# Patient Record
Sex: FEMALE | Race: WHITE | NOT HISPANIC OR LATINO | URBAN - METROPOLITAN AREA
[De-identification: names, ages, dates, MRNs, and addresses within clinical notes are randomized per-mention and may not be internally consistent; named-entity substitution may affect disease eponyms.]

---

## 2017-03-30 ENCOUNTER — OFFICE VISIT - RIVER FALLS (OUTPATIENT)
Dept: FAMILY MEDICINE | Facility: CLINIC | Age: 37
End: 2017-03-30

## 2017-03-30 ASSESSMENT — MIFFLIN-ST. JEOR: SCORE: 1253.75

## 2017-09-28 ENCOUNTER — OFFICE VISIT - RIVER FALLS (OUTPATIENT)
Dept: FAMILY MEDICINE | Facility: CLINIC | Age: 37
End: 2017-09-28

## 2017-09-28 ASSESSMENT — MIFFLIN-ST. JEOR: SCORE: 1249.21

## 2017-09-29 LAB
CHOLEST SERPL-MCNC: 139 MG/DL
CHOLEST/HDLC SERPL: 2.4 {RATIO}
GLUCOSE BLD-MCNC: 86 MG/DL (ref 65–99)
HDLC SERPL-MCNC: 57 MG/DL
LDLC SERPL CALC-MCNC: 71 MG/DL
NONHDLC SERPL-MCNC: 82 MG/DL
TRIGL SERPL-MCNC: 37 MG/DL

## 2018-07-23 ENCOUNTER — OFFICE VISIT - RIVER FALLS (OUTPATIENT)
Dept: FAMILY MEDICINE | Facility: CLINIC | Age: 38
End: 2018-07-23

## 2018-07-23 ASSESSMENT — MIFFLIN-ST. JEOR: SCORE: 1253.75

## 2019-07-29 ENCOUNTER — OFFICE VISIT - RIVER FALLS (OUTPATIENT)
Dept: FAMILY MEDICINE | Facility: CLINIC | Age: 39
End: 2019-07-29

## 2019-07-29 ASSESSMENT — MIFFLIN-ST. JEOR: SCORE: 1249.21

## 2019-09-05 ENCOUNTER — OFFICE VISIT - RIVER FALLS (OUTPATIENT)
Dept: FAMILY MEDICINE | Facility: CLINIC | Age: 39
End: 2019-09-05

## 2020-05-21 ENCOUNTER — OFFICE VISIT - RIVER FALLS (OUTPATIENT)
Dept: FAMILY MEDICINE | Facility: CLINIC | Age: 40
End: 2020-05-21

## 2021-01-29 ENCOUNTER — IMMUNIZATION (OUTPATIENT)
Dept: NURSING | Facility: CLINIC | Age: 41
End: 2021-01-29
Payer: COMMERCIAL

## 2021-01-29 PROCEDURE — 91300 PR COVID VAC PFIZER DIL RECON 30 MCG/0.3 ML IM: CPT

## 2021-01-29 PROCEDURE — 0001A PR COVID VAC PFIZER DIL RECON 30 MCG/0.3 ML IM: CPT

## 2021-01-31 ENCOUNTER — HEALTH MAINTENANCE LETTER (OUTPATIENT)
Age: 41
End: 2021-01-31

## 2021-02-19 ENCOUNTER — IMMUNIZATION (OUTPATIENT)
Dept: NURSING | Facility: CLINIC | Age: 41
End: 2021-02-19
Attending: INTERNAL MEDICINE
Payer: COMMERCIAL

## 2021-02-19 PROCEDURE — 0002A PR COVID VAC PFIZER DIL RECON 30 MCG/0.3 ML IM: CPT

## 2021-02-19 PROCEDURE — 91300 PR COVID VAC PFIZER DIL RECON 30 MCG/0.3 ML IM: CPT

## 2021-06-07 ENCOUNTER — OFFICE VISIT - RIVER FALLS (OUTPATIENT)
Dept: FAMILY MEDICINE | Facility: CLINIC | Age: 41
End: 2021-06-07

## 2021-06-07 ASSESSMENT — MIFFLIN-ST. JEOR: SCORE: 1270.53

## 2021-06-08 ENCOUNTER — COMMUNICATION - RIVER FALLS (OUTPATIENT)
Dept: FAMILY MEDICINE | Facility: CLINIC | Age: 41
End: 2021-06-08

## 2021-06-08 LAB
CHOLEST SERPL-MCNC: 144 MG/DL
CHOLEST/HDLC SERPL: 2.4 {RATIO}
GLUCOSE BLD-MCNC: 83 MG/DL (ref 65–99)
HDLC SERPL-MCNC: 60 MG/DL
LDLC SERPL CALC-MCNC: 72 MG/DL
NONHDLC SERPL-MCNC: 84 MG/DL
TRIGL SERPL-MCNC: 42 MG/DL

## 2021-09-11 ENCOUNTER — HEALTH MAINTENANCE LETTER (OUTPATIENT)
Age: 41
End: 2021-09-11

## 2022-02-11 VITALS
BODY MASS INDEX: 25.58 KG/M2 | DIASTOLIC BLOOD PRESSURE: 73 MMHG | WEIGHT: 139 LBS | HEIGHT: 62 IN | SYSTOLIC BLOOD PRESSURE: 106 MMHG | HEART RATE: 84 BPM | TEMPERATURE: 97.6 F

## 2022-02-11 VITALS
HEIGHT: 62 IN | BODY MASS INDEX: 25.4 KG/M2 | WEIGHT: 138 LBS | TEMPERATURE: 98.6 F | SYSTOLIC BLOOD PRESSURE: 112 MMHG | HEART RATE: 68 BPM | DIASTOLIC BLOOD PRESSURE: 70 MMHG

## 2022-02-11 VITALS
HEART RATE: 70 BPM | HEIGHT: 62 IN | BODY MASS INDEX: 25.58 KG/M2 | TEMPERATURE: 97.9 F | SYSTOLIC BLOOD PRESSURE: 108 MMHG | DIASTOLIC BLOOD PRESSURE: 64 MMHG | WEIGHT: 139 LBS

## 2022-02-11 VITALS
SYSTOLIC BLOOD PRESSURE: 110 MMHG | HEIGHT: 62 IN | BODY MASS INDEX: 25.4 KG/M2 | DIASTOLIC BLOOD PRESSURE: 70 MMHG | HEART RATE: 76 BPM | TEMPERATURE: 96.3 F | WEIGHT: 138 LBS

## 2022-02-11 VITALS
DIASTOLIC BLOOD PRESSURE: 68 MMHG | SYSTOLIC BLOOD PRESSURE: 100 MMHG | WEIGHT: 140 LBS | TEMPERATURE: 98.9 F | HEART RATE: 96 BPM | OXYGEN SATURATION: 97 % | BODY MASS INDEX: 25.61 KG/M2

## 2022-02-11 VITALS
TEMPERATURE: 97.8 F | SYSTOLIC BLOOD PRESSURE: 104 MMHG | DIASTOLIC BLOOD PRESSURE: 70 MMHG | HEART RATE: 80 BPM | WEIGHT: 142.7 LBS | HEIGHT: 62 IN | BODY MASS INDEX: 26.26 KG/M2

## 2022-02-16 NOTE — NURSING NOTE
Generalized Anxiety Disorder Screening Entered On:  6/15/2021 2:59 PM CDT    Performed On:  6/7/2021 2:58 PM CDT by Carolyn Mckeon               SY-7   SY Nervous, Anxious On Edge :   Several days   SY Control Worrying B :   Several days   SY Worrying Too Much :   Several days   SY Trouble Relaxing :   More than half the days   SY Restless :   More than half the days   SY Easily Annoyed/Irritable :   Several days   SY Afraid :   Several days   SY Total Screening Score :   9    Carolyn Mckeon - 6/15/2021 2:58 PM CDT

## 2022-02-16 NOTE — NURSING NOTE
Comprehensive Intake Entered On:  2019 2:13 PM CDT    Performed On:  2019 2:09 PM CDT by Arti Bernal CMA               Summary   Chief Complaint :   Medication management   Weight Measured :   138 lb(Converted to: 138 lb 0 oz, 62.60 kg)    Height Measured :   62 in(Converted to: 5 ft 2 in, 157.48 cm)    Body Mass Index :   25.24 kg/m2 (HI)    Body Surface Area :   1.65 m2   Systolic Blood Pressure :   112 mmHg   Diastolic Blood Pressure :   70 mmHg   Mean Arterial Pressure :   84 mmHg   Peripheral Pulse Rate :   68 bpm   BP Site :   Right arm   Pulse Site :   Radial artery   BP Method :   Manual   HR Method :   Manual   Temperature Tympanic :   98.6 DegF(Converted to: 37.0 DegC)    Arti Bernal CMA - 2019 2:09 PM CDT   Health Status   Allergies Verified? :   Yes   Medication History Verified? :   Yes   Medical History Verified? :   No   Pre-Visit Planning Status :   Completed   Tobacco Use? :   Never smoker   Arti Bernal CMA - 2019 2:09 PM CDT   Demographics   Last Name :   YELITZA   Address :   [address] [place] [address]   First Name :   ITZEL Dela Cruz Initial :   L   Responsible Party Date of Birth () :   1980 CDT   City :   [place]   State :   [place]   Zip Code :   [address]   Arti Bernal CMA - 2019 2:09 PM CDT   Ancillary Services Grid   Name :    [facility]              Type of Service :    Pharmacy              Location :    [place], [place]                Arti Bernal CMA - 2019 2:09 PM CDT         Meds / Allergies   (As Of: 2019 2:13:13 PM CDT)   Allergies (Active)   penicillin  Estimated Onset Date:   Unspecified ; Reactions:   rash ; Created By:   Juancarlos Duenas MD; Reaction Status:   Active ; Category:   Drug ; Substance:   penicillin ; Type:   Allergy ; Updated By:   Juancarlos Duenas MD; Reviewed Date:   2019 2:11 PM CDT        Medication List   (As Of: 2019 2:13:13 PM CDT)   Prescription/Discharge Order    buPROPion  :   buPROPion ; Status:   Prescribed ;  Ordered As Mnemonic:   buPROPion 150 mg/24 hours (XL) oral tablet, extended release ; Simple Display Line:   450 mg, 3 tab(s), po, q 24 hrs, 280 tab(s), 3 Refill(s) ; Ordering Provider:   Juancarlos Duenas MD; Catalog Code:   buPROPion ; Order Dt/Tm:   7/23/2018 9:14:35 AM          venlafaxine  :   venlafaxine ; Status:   Prescribed ; Ordered As Mnemonic:   Effexor XR 75 mg oral capsule, extended release ; Simple Display Line:   75 mg, 1 cap(s), po, daily, 95 cap(s), 3 Refill(s) ; Ordering Provider:   Juancarlos Duenas MD; Catalog Code:   venlafaxine ; Order Dt/Tm:   7/23/2018 9:14:52 AM

## 2022-02-16 NOTE — PROGRESS NOTES
Chief Complaint    Concerns with HA, semi-productive cough, nasal drainage/congestion x 5 days. Today chest tightness. No recent travel.  History of Present Illness      Cold symptoms for past 5 days with nasal drainage and congestion. Has had cough and body aches. Energy actually improving. Harder to take a deep breath. Having some wheezing and chest congestion.      Daughter recently had pneumonia.  Review of Systems      No fevers      No vomiting      No leg swelling      No family history of blood clots  Physical Exam   Vitals & Measurements    T: 98.9   F (Tympanic)  HR: 96(Peripheral)  BP: 100/68  SpO2: 97%     WT: 140 lb       General: No acute distress.      HENT: Tympanic membranes are clear, No pharyngeal erythema.      Neck: No lymphadenopathy.      Respiratory: Crackles right lower lobe.  No significant wheezing      Cardiovascular: Normal rate, Regular rhythm.      Musculoskeletal: Normal gait.      Discussed and patient declines chest x-ray for now.  Assessment/Plan      Pneumonia: Treated with Zithromax and follow-up if not improving  Patient Information     Name:ITZEL TORRE      Address:      [address] [place] [address]     Sex:Female     YOB: 1980     [phone]     Emergency Contact:ELTON TORRE     MRN:[id number]     FIN:[id number]     Location:[facility]     Date of Service:09/05/2019      Primary Care Physician:       Juancarlso Duenas MD, [phone]      Attending Physician:       Juancarlos Duenas MD, [phone]  Problem List/Past Medical History    Ongoing     Anxiety     Chemical dependency     Chronic GERD     Eating disorder     History of chicken pox     Mild recurrent major depression     Seasonal allergies    Historical     Pregnancy     Pregnancy  Procedure/Surgical History     Extraction of wisdom tooth  Medications    buPROPion 150 mg/24 hours (XL) oral tablet, extended release, 450 mg= 3 tab(s), Oral, q 24 hrs, 3  refills    Effexor XR 75 mg oral capsule, extended release, 75 mg= 1 cap(s), Oral, daily, 3 refills  Allergies    penicillin (rash)

## 2022-02-16 NOTE — TELEPHONE ENCOUNTER
---------------------  From: Juancarlos Duenas MD   To: ITZEL TORRE    Sent: 6/8/2021 10:16:39 AM CDT  Subject: Labs     Dear Itzel Kemp    Labs are good  No diabetes  Cholesterol excellent      Results:  Date Result Name Value Ref Range   6/7/2021 8:52 AM Glucose Level 83 mg/dL (65 - 99)   6/7/2021 8:52 AM Cholesterol 144 mg/dL ( - <200)   6/7/2021 8:52 AM Non-HDL Cholesterol 84 ( - <130)   6/7/2021 8:52 AM HDL 60 mg/dL (> OR = 50 - )   6/7/2021 8:52 AM Cholesterol/HDL Ratio 2.4 ( - <5.0)   6/7/2021 8:52 AM LDL 72    6/7/2021 8:52 AM Triglyceride 42 mg/dL ( - <150)     Bc Duenas M.D.

## 2022-02-16 NOTE — PROGRESS NOTES
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 Recovery going well.  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 Has reapplied to the board and getting random drug testing.  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  Realized how depressed she was from drugs. Unsure if drugs contributed to the depression. Had bad postpartum depression with previous pregnancy while on cymbalta. Restarted Effexor  and has done well.  Has been on antidepressants since age 20. Other medications have worked but experienced tachyphylaxis but other medications and depression could have been affected by her drug use. Clean as of 2012 from drugs and alcohol  Panic attacks have become rare since stopping drugs and when has them much less severe. Still gets some anxiety.  Never had any suicide attempts.      Date of Service: 2018 08:40 am  Performing Location: [facility]  Encounter#: [id number]      Primary Care Provider (PCP):  Juancarlos Duenas MD    NPI# [id number]      Referring Provider:  Juancarlos Duenas MD    NPI# [id number]      Chief Complaint   2018 9:02 AM CDT    Depression/anxiety med check        History of Present Illness     Combination of Wellbutrin and effexor has her feeling normal. Has increased Wellbutrin XL to 450mg and tolerating it better (cloudy at that dose in past).    2012 last alcohol and drugs. Had not been sober since age 16.  Started RN job in outpatient Dermatology 2016. No longer being monitored.No narcotics in this  clinic.    Past information:  Anxiety is all the time but more noticeable in the evening. She feels irritable. She is anxious about her weight and gets angry mad thoughts about her weight. Her therapist is worried she might relapse with drugs if the anxiety is not controlled. She does not have panic attacks. It is an overwhelming sense of inability to settle down. Does not want to see people because of her weight and knows logically that is not right. Anxiety started when went to college. Tried paxil, zoloft, cymbalta. Feels blunted when on higher dose of Effexor.     She and  have noticed an improvement in her anxiety. Feels more laid back and gets less done. Feels will always have thoughts about her weight (has thought about it since 6 years old). Imipramine helped her bridge herself until she could work on the mental tools to help with her anxiety. Imipramine has helped all the symptoms she was experiencing in Feb 2014.    Had negative urine testing August and September 2012 (checked 3x) and June 2014         Review of Systems     Constitutional: falling asleep normally. Still waking up and binge eating in the night.  Neurologic: minimal headaches  PHQ-9: 2pts (July 2018). 1pt (March 2017). 0pts (October 2016). 3pts (June 2016). 0pts (February 2015). 1pt (June 2014); 2pts (March2014). 2pts (February 2014). 4pts (October 2013). 1pts (June 2013); 0pts (Feb 2013)  MDQ: 2 pts and no problem (June 2016). 0pts (June 2014) 1pt and no problem (March 2014). 1pt and no problem (February 2014)  SY-7: 2pts (July 2018). 1pt (March 2017). 0pts (October 2016). 11 pts (June 2016)  CAGE: 0pts (quit drinking)       Health Status   Allergies:    Allergic Reactions (Selected)  Severity Not Documented  Penicillin (Rash)   Medications:  (Selected)   Prescriptions  Prescribed  Effexor XR 75 mg oral capsule, extended release: 1 cap(s) ( 75 mg ), po, daily, # 95 cap(s), 3 Refill(s), Type: Maintenance, [facility]  [place], [place], 1 cap(s) po daily  buPROPion 150 mg/24 hours (XL) oral tablet, extended release: 3 tab(s) ( 450 mg ), po, q 24 hrs, # 280 tab(s), 0 Refill(s), Type: Maintenance, Pharmacy: [facility], [place], [place], 3 tab(s) po q 24 hrs   Problem list:    All Problems  Anxiety / SNOMED CT 03676757 / Confirmed  Chemical dependency / SNOMED CT 962310361 / Confirmed  Eating disorder / SNOMED CT 550154348 / Confirmed  Chronic GERD / SNOMED CT 8437419620 / Confirmed  History of chicken pox / SNOMED CT 890624497 / Confirmed  Mild recurrent major depression / SNOMED CT 433973709 / Confirmed  Seasonal allergies / SNOMED CT 537866552 / Confirmed  Resolved: Pregnancy / SNOMED CT 762228274  Resolved: Pregnancy / SNOMED CT 965214946  Canceled: Depression, major, recurrent, mild / ICD-9-.31      Histories   Family History.Procedure history:    Extraction of wisdom tooth (SNOMED CT 538529474).     Family History: no bipolar, ADHD, anxiety, suicide; father/mother/brother with depression; mom addicted oxycontin in past; brother some addiction  Social History:  ([employer]) 2009. 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    Neck:  No lymphadenopathy.    Respiratory:  Lungs are clear to auscultation.    Cardiovascular:  Normal rate, Regular rhythm.    Musculoskeletal:  Normal gait.    Psychiatric:  Appropriate mood & affect, Non-suicidal.        Impression and Plan   Diagnosis     Depression, major, recurrent, mild (UTB82-UO F33.0).     Anxiety (BCX19-SB F41.9).     Chemical Dependency (IPN79-OM F19.20).     Depression/anxiety: continue Wellbutrin XL 450mg daily and Effexor XR 75mg daily. Goes to meetings twice a month (Nursing peer support network). Continues to be sober.     Discussed wellbutrin and effexor with pregnancy although no desire to have any more children (has 2 children)    Reports being nervous about handling narcotics and wants to avoid it (none in the Dermatology clinic).

## 2022-02-16 NOTE — PROGRESS NOTES
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 Recovery going well.  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 Has reapplied to the board and getting random drug testing.  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  Realized how depressed she was from drugs. Unsure if drugs contributed to the depression. Had bad postpartum depression with previous pregnancy while on cymbalta. Restarted Effexor  and has done well.  Has been on antidepressants since age 20. Other medications have worked but experienced tachyphylaxis but other medications and depression could have been affected by her drug use. Clean as of 2012 from drugs and alcohol  Panic attacks have become rare since stopping drugs and when has them much less severe. Still gets some anxiety.  Never had any suicide attempts.      Date of Service: 2019 02:00 pm  Performing Location: [facility]  Encounter#: [id number]      Primary Care Provider (PCP):  Juancarlos Duenas MD    NPI# [id number]      Referring Provider:  Juancarlos Duenas MD    NPI# [id number]      Chief Complaint   2019 2:09 PM CDT    Medication management        History of Present Illness     Combination of Wellbutrin and effexor has her feeling normal. Anxiety still present but controlled. Has increased Wellbutrin XL to 450mg and tolerating it better (cloudy at that dose in past).    2012 last alcohol and drugs. Had not been sober since age 16.  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 No  longer being monitored.No narcotics in this clinic.    Past information:  Anxiety is all the time but more noticeable in the evening. She feels irritable. She is anxious about her weight and gets angry mad thoughts about her weight. Her therapist is worried she might relapse with drugs if the anxiety is not controlled. She does not have panic attacks. It is an overwhelming sense of inability to settle down. Does not want to see people because of her weight and knows logically that is not right. Anxiety started when went to college. Tried paxil, zoloft, cymbalta. Feels blunted when on higher dose of Effexor.     She and  have noticed an improvement in her anxiety. Feels more laid back and gets less done. Feels will always have thoughts about her weight (has thought about it since 6 years old). Imipramine helped her bridge herself until she could work on the mental tools to help with her anxiety. Imipramine has helped all the symptoms she was experiencing in Feb 2014.    Had negative urine testing August and September 2012 (checked 3x) and June 2014         Review of Systems     Constitutional: falling asleep normally. Still waking up and binge eating in the night.  Neurologic: minimal headaches  PHQ-9: 2pts (July 2019). 2pts (July 2018). 1pt (March 2017). 0pts (October 2016). 3pts (June 2016). 0pts (February 2015). 1pt (June 2014); 2pts (March2014). 2pts (February 2014). 4pts (October 2013). 1pts (June 2013); 0pts (Feb 2013)  MDQ: 0pts (July 2019). 2 pts and no problem (June 2016). 0pts (June 2014) 1pt and no problem (March 2014). 1pt and no problem (February 2014)  SY-7: 6pts (July 2019). 2pts (July 2018). 1pt (March 2017). 0pts (October 2016). 11 pts (June 2016)  CAGE: 0pts (quit drinking)       Health Status   Allergies:    Allergic Reactions (Selected)  Severity Not Documented  Penicillin (Rash)   Medications:  (Selected)   Prescriptions  Prescribed  Effexor XR 75 mg oral capsule, extended release: 1  cap(s) ( 75 mg ), po, daily, # 95 cap(s), 3 Refill(s), Type: Maintenance, Pharmacy: [facility], [place], [place], 1 cap(s) Oral daily  buPROPion 150 mg/24 hours (XL) oral tablet, extended release: 3 tab(s) ( 450 mg ), po, q 24 hrs, # 280 tab(s), 3 Refill(s), Type: Maintenance, Pharmacy: [facility], [place], [place], 3 tab(s) Oral q 24 hrs   Problem list:    All Problems  Anxiety / SNOMED CT 82062277 / Confirmed  Chemical dependency / SNOMED CT 142130052 / Confirmed  Eating disorder / SNOMED CT 654353356 / Confirmed  Chronic GERD / SNOMED CT 3329833770 / Confirmed  History of chicken pox / SNOMED CT 995531595 / Confirmed  Mild recurrent major depression / SNOMED CT 044915259 / Confirmed  Seasonal allergies / SNOMED CT 148512349 / Confirmed  Resolved: Pregnancy / SNOMED CT 874167803  Resolved: Pregnancy / SNOMED CT 422180249  Canceled: Depression, major, recurrent, mild / ICD-9-.31      Histories   Procedure history:    Extraction of wisdom tooth (SNOMED CT 886775038).     Family History: no bipolar, ADHD, anxiety, suicide; father/mother/brother with depression. Mom addicted oxycontin in past. 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    Neck:  No lymphadenopathy.    Respiratory:  Lungs are clear to auscultation.    Cardiovascular:  Normal rate,  Regular rhythm.    Musculoskeletal:  Normal gait.    Psychiatric:  Appropriate mood & affect, Non-suicidal.       Impression and Plan   Diagnosis     Depression, major, recurrent, mild (HTW28-GN F33.0).     Anxiety (BTZ02-MD F41.9).     Chemical Dependency (VGK89-UJ F19.20).       Depression/anxiety: continue Wellbutrin XL 450mg daily and Effexor XR 75mg daily. Continues to be sober.     Have discussed wellbutrin and effexor with pregnancy although no desire to have any more children (has 2 children)  Reports being nervous about handling narcotics and wants to avoid it (none in the Dermatology clinic).    Gets [facility] women [facility] through [facility]

## 2022-02-16 NOTE — TELEPHONE ENCOUNTER
---------------------  From: Radha Munoz CMA   To: [facility] Message Pool ([id number]_[place]);     Sent: 5/25/2021 5:45:10 PM CDT  Subject: Wellbutrin refill     PCP:   ARYAN      Time of Call:  1740       Person Calling:  Patient  Phone number:  [phone]    Returned call at: _    Note:   Patient called asking for a small refill of Wellbutrin be sent to [facility]. Last filled 5/21/20 x1 year. She has an annual scheduled for 6/7/21. She did not mention needing the Effexor Rx refilled. Advise    Last office visit and reason:  5/21/20 Med check w/ ARYAN---------------------  From: rAti Bernal CMA ([facility] Message Pool ([id number]_[facility]))   To: Juancarlos Duenas MD;     Sent: 5/26/2021 9:05:53 AM CDT  Subject: FW: Wellbutrin refill---------------------  From: Juancarlos Duenas MD   To: Arti Bernal CMA;     Sent: 5/26/2021 9:23:58 AM CDT  Subject: RE: Wellbutrin refill     DoneCalled and LVM notifying pt

## 2022-02-16 NOTE — NURSING NOTE
Generalized Anxiety Disorder Screening Entered On:  7/30/2019 8:39 AM CDT    Performed On:  7/29/2019 8:36 AM CDT by Latosha Philippe               Generalized Anxiety Disorder Screening   SY Nervous, Anxious On Edge :   Not at all   SY Control Worrying B :   Several days   SY Worrying Too Much :   Several days   SY Restless :   Several days   SY Easily Annoyed/Irritable :   Several days   SY Afraid :   Several days   SY Trouble Relaxing :   Several days   SY Total Screening Score :   6    SY Difficulty with Work, Home, Others :   Not difficult at all   Latosha Philippe - 7/30/2019 8:36 AM CDT

## 2022-02-16 NOTE — PROGRESS NOTES
Patient:   ITZEL TORRE            MRN: [id number]            FIN: [id number]               Age:   40 years     Sex:  Female     :  1980   Associated Diagnoses:   Annual physical exam; Anxiety; Mild recurrent major depression   Author:   Juancarlos Duenas MD      Visit Information      Date of Service: 2021 08:00 am  Performing Location: [facility]  Encounter#: [id number]      Primary Care Provider (PCP):  Juancarlos Duenas MD    NPI# [id number]      Chief Complaint   2021 8:05 AM CDT     Yearly px        Well Adult History   Mom had deep brain stimulator for essential tremor and has gone down hill. Possibly has Alzeimhers    Combination of Wellbutrin and effexor has her feeling normal. Anxiety still present but controlled. Has increased Wellbutrin XL to 450mg and tolerating it better (still feels some brain fog).    2012 last alcohol and drugs. Had not been sober since age 16.  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 No longer being monitored. No narcotics in this clinic.    Past information:  Before wellbutrin and effexor anxiety was all the time but more noticeable in the evening. She felt irritable. She was anxious about her weight and would get angry mad thoughts about her weight. Her therapist was worried she might relapse with drugs if the anxiety was not controlled. She did not have panic attacks. It was an overwhelming sense of inability to settle down. Did not want to see people because of her weight and knew logically that was not right. Anxiety started when went to college. Tried paxil, zoloft, cymbalta. Felt blunted when on higher dose of Effexor.   She and  have noticed an improvement in her anxiety. Felt more laid back and would get less done. Sterling would always have thoughts about her weight (has thought about it since 6 years old). Imipramine helped her bridge herself until she could work on the mental tools to help with  her anxiety. Imipramine has helped all the symptoms she was experiencing in Feb 2014. Imipramine made her tired.  Realized how depressed she was from drugs. Unsure if drugs contributed to the depression. Had bad postpartum depression with previous pregnancy while on cymbalta. Restarted Effexor 2011 and has done well.  Has been on antidepressants since age 20. Other medications have worked but experienced tachyphylaxis but other medications and depression could have been affected by her drug use. Clean as of July 22, 2012 from drugs and alcohol  Panic attacks have become rare since stopping drugs and when has them much less severe. Still gets some anxiety.      Review of Systems   Constitutional:  No fever.    Eye:  No recent visual problem.    Respiratory:  No shortness of breath, No cough.    Cardiovascular:  No chest pain, No palpitations, No peripheral edema.    Breast:  Negative.    Gastrointestinal:  No nausea, No vomiting, No diarrhea, No constipation, No rectal bleeding.    Genitourinary:  No change in urine stream.    Hematology/Lymphatics:  No bruising tendency, No bleeding tendency.    Endocrine:  Negative.    Musculoskeletal:  No joint pain, No muscle pain.    Integumentary:  No rash.    Neurologic:  Alert and oriented X4, No abnormal balance, No numbness, No headache.    Psychiatric:  Negative.    All other systems reviewed and negative   Regular menses    PHQ-9:  3pts (June 2021). 2pts (July 2019). 2pts (July 2018). 1pt (March 2017). 0pts (October 2016). 3pts (June 2016). 0pts (February 2015). 1pt (June 2014); 2pts (March2014). 2pts (February 2014). 4pts (October 2013). 1pts (June 2013); 0pts (Feb 2013)  MDQ: 0pts (July 2019). 2 pts and no problem (June 2016). 0pts (June 2014) 1pt and no problem (March 2014). 1pt and no problem (February 2014)  SY-7:  9pts (June 2021). 6pts (July 2019). 2pts (July 2018). 1pt (March 2017). 0pts (October 2016). 11 pts (June 2016)  CAGE: 0pts (quit drinking)        Health  Status   Allergies:    Allergic Reactions (Selected)  Severity Not Documented  Penicillin (Rash)   Medications:  (Selected)   Prescriptions  Prescribed  Effexor XR 75 mg oral capsule, extended release: = 1 cap(s) ( 75 mg ), po, daily, # 95 cap(s), 3 Refill(s), Type: Maintenance, Pharmacy: [facility], 1 cap(s) Oral daily, 62, in, 07/29/19 14:09:00 CDT, Height Measured, 140, lb, 09/05/19 15:58:00 CDT, Weight Measured  buPROPion 150 mg/24 hours (XL) oral tablet, extended release: 3 tab(s) ( 450 mg ), po, q 24 hrs, # 280 tab(s), 0 Refill(s), Type: Maintenance, Pharmacy: [facility], [place], [place], 3 tab(s) Oral q 24 hrs, 62, in, 07/29/19 14:09:00 CDT, Height Measured, 140, lb, 09/05/19 15:58:00 CDT, Weight Measured   Problem list:    All Problems  Anxiety / SNOMED CT 48046505 / Confirmed  Chemical dependency / SNOMED CT 900452094 / Confirmed  Eating disorder / SNOMED CT 310213311 / Confirmed  Chronic GERD / SNOMED CT 1383761650 / Confirmed  History of chicken pox / SNOMED CT 790102391 / Confirmed  Mild recurrent major depression / SNOMED CT 985190997 / Confirmed  Seasonal allergies / SNOMED CT 834414272 / Confirmed  Resolved: Pregnancy / SNOMED CT 225579420  Resolved: Pregnancy / SNOMED CT 656468136  Canceled: Depression, major, recurrent, mild / ICD-9-.31      Histories   Procedure history:    Extraction of wisdom tooth (SNOMED CT 950272674).   Family History: no bipolar, ADHD, anxiety, suicide. Father/mother/brother with depression. Mom addicted oxycontin in past. Brother (Aries) some addiction and doing well  Social History:  (Eduardo) 2009. 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 Children ages 7 and 9. Home with children Monday and Friday and go to day care three days a week.  Eduardo works [employer] ().  vasectomy      Physical Examination   Vital Signs   6/7/2021 8:05 AM CDT Temperature Tympanic 97.8 DegF  LOW    Peripheral Pulse Rate 80  bpm    Pulse Site Radial artery    HR Method Manual    Systolic Blood Pressure 104 mmHg    Diastolic Blood Pressure 70 mmHg    Mean Arterial Pressure 81 mmHg    BP Site Right arm    BP Method Manual      Measurements from flowsheet : Measurements   6/7/2021 8:05 AM CDT Height Measured - Standard 62 in    Weight Measured - Standard 142.7 lb    BSA 1.68 m2    Body Mass Index 26.1 kg/m2  HI      General:  Alert and oriented, No acute distress.    Eye:  Normal conjunctiva.    HENT:  Tympanic membranes are clear, No pharyngeal erythema.    Neck:  Non-tender, No lymphadenopathy, No thyromegaly.    Respiratory:  Lungs are clear to auscultation, Respirations are non-labored.    Cardiovascular:  Normal rate, Regular rhythm, No murmur.    Breast:  No mass, No tenderness, No discharge.    Gastrointestinal:  Soft, Non-tender, Non-distended.    Gynecologic:  Internal and external genitalia are normal. Urethra normal. Vaginal and cervical mucosa normal. No uterine or ovarian masses..    Musculoskeletal:  Normal range of motion, Normal strength, No swelling, Normal gait.    Integumentary:  Pink, No rash.    Neurologic:  Alert, Oriented, Normal sensory, Normal motor function, No focal deficits.    Psychiatric:  Appropriate mood & affect.       Impression and Plan   Diagnosis     Annual physical exam (WDN71-IY Z00.00).     Anxiety (UVC20-LP F41.9).     Mild recurrent major depression (OJZ13-SV F33.0).       Depression/anxiety: continue Wellbutrin XL 450mg daily (may trial 300mg daily if desires) and Effexor XR 75mg daily. Continues to be sober. Wellbutrin and effexor likely indefinite    Have discussed wellbutrin and effexor with pregnancy although no desire to have any more children (has 2 children)  Reports being nervous about handling narcotics and wants to avoid it (none in the Dermatology clinic).  Gets Well women care through [facility]  Colon Cancer Screening: No early family history  Immunizations: Adacel 2013. Coronavirus  2021  Hx eating disorder: always reminders but doing well  Weight: discussed

## 2022-02-16 NOTE — PROGRESS NOTES
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 Has reapplied to the board and getting random drug testing.  Realized how depressed she was from drugs. Unsure if drugs contributed to the depression. Had bad postpartum depression with previous pregnancy while on cymbalta. Could not laugh on effexor and felt she gained weight on it.  Has been on antidepressants since age 20. Other medications have worked but experienced tachyphylaxis but other medications and depression could have been affected by her drug use. Clean as of 2012.   Panic attacks have become rare since stopping drugs and when has them much less severe. Still gets some anxiety.  Never had any suicide attempts.       Well Adult History   Has not received any narcotic prescriptions for physician or dentist in past couple of years. Reviewed both [place] and [place] Drug monitoring program website.    Life is manageable with her anxiety on the effexor and wellbutrin  Tried to taper off the Effexor. Became very depressed and restarted the medication.    Past information  She and  have noticed an improvement in her anxiety. Feels more laid back and gets less done. Has fewer thoughts about her weight. Going to 50mg imipramine made her too groggy as well as 25mg daily. In past decreasing to 12.5mg she became more irritable and upset but now manageable without it and no longer ignoring the children. Imipramine has helped all the symptoms she was experiencing in  Feb 2014.    February 2014: Anxiety is all the time but more noticeable in the evening. She feels irritable. She is anxious about her weight and gets angry mad thoughts about her weight. Her therapist is worried she might relapse with drugs if the anxiety is not controlled. She does not have panic attacks. It is an overwhelming sense of inability to settle down. Does not want to see people because of her weight and knows logically that is not right. Anxiety started when went to college. Tried paxil, zoloft, cymbalta.    Had negative urine testing August and September 2012 (checked 3x) and June 2014  Denies cravings.  July 21st, 2012 last alcohol and drugs.    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         Review of Systems   Constitutional:  No fever.    Eye:  No recent visual problem.    Respiratory:  No shortness of breath, No cough.    Cardiovascular:  No chest pain, No palpitations, No peripheral edema.    Breast:  Negative.    Gastrointestinal:  No nausea, No vomiting, No diarrhea, No rectal bleeding.    Genitourinary:  No change in urine stream.    Hematology/Lymphatics:  No bruising tendency, No bleeding tendency.    Endocrine:  Negative.    Musculoskeletal:  No joint pain, No muscle pain.    Integumentary:  No rash.    Neurologic:  Alert and oriented X4, No abnormal balance, No numbness, No headache.    Psychiatric:  Negative.    All other systems reviewed and negative   PHQ-9: 2pts (September 2017). 7pts (August 2016). 2pts (May 2015); 0pts (February 2015); 1pt (June 2014); 2pts (March2014); 2pts (February 2014); 4pts (October 2013); 1pts (June 2013); 0pts (Feb 2013)  MDQ: 0pts (June 2014) 1pt and no problem (March 2014); 1pt and no problem (February 2014).   Night sweats have seemed to go away  Gastrointestinal: no heartburn      Health  Status   Allergies:    Allergic Reactions (Selected)  Severity Not Documented  Penicillin (Rash)   Medications:  (Selected)   Prescriptions  Prescribed  Effexor XR 75 mg oral capsule, extended release: 1 cap(s) ( 75 mg ), po, daily, # 95 cap(s), 1 Refill(s), Type: Maintenance, Pharmacy: [place], [place], 1 cap(s) po daily  buPROPion 150 mg/24 hours (XL) oral tablet, extended release: 2 tab(s) ( 300 mg ), po, q 24 hrs, # 190 tab(s), 1 Refill(s), Type: Maintenance, Pharmacy: [place], [place], 2 tab(s) po q 24 hrs   Problem list:    All Problems  Anxiety / SNOMED CT 78617934 / Confirmed  Chemical Dependency / ICD-9-.90 / Confirmed  Eating disorder / SNOMED CT 846753614 / Confirmed  Chronic GERD / SNOMED CT 9480597549 / Confirmed  Mild recurrent major depression / SNOMED CT 072185720 / Confirmed  Resolved: Pregnancy / SNOMED CT 108321145  Resolved: Pregnancy / SNOMED CT 173732886  Canceled: Depression, major, recurrent, mild / ICD-9-.31      Histories   Family History: no bipolar, ADHD, anxiety, suicide; father/mother/brother with depression; mom addicted oxycontin in past; brother some addiction. Dad with diabetes. Brother  of osteosarcoma   Procedure history:    Extraction of wisdom tooth (SNOMED CT 890549513).   Social History:  (Eduardo) . Two children (6,4). 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     Eye:  Normal conjunctiva.    HENT:  Tympanic membranes are clear, No pharyngeal erythema.    Neck:  Non-tender, No lymphadenopathy.    Respiratory:  Lungs are clear to auscultation, Respirations are non-labored.    Cardiovascular:  Normal rate, Regular rhythm, No murmur.    Gastrointestinal:  Soft, Non-tender, Non-distended.    Lymphatics:  No lymphadenopathy neck, axilla, groin.    Musculoskeletal:  Normal range of motion, Normal strength, No swelling, Normal gait.    Integumentary:  Pink, No rash.    Neurologic:  Alert, Oriented, Normal sensory, Normal motor function, No focal deficits.    Psychiatric:  Appropriate mood & affect.       Impression and Plan   Diagnosis     Annual physical exam (NNJ25-SW Z00.00).     Prediabetes (OYL03-GO R73.09).     Anxiety (XER40-RO F41.9).     Mild recurrent major depression (LMV15-UO F33.0).         Cervical/Breast Cancer Screening: done with gynecologist  Colon Cancer Screening: no early family history  Immunizations: Adacel 2013  Night sweats: seems to have resolved  Depression/anxiety: doing well with wellbutrin and add effexor (low dose). Feels needs indefinite  Chemical Dependency: denies any alcohol and drugs since July 22nd, 2012.  Arrange fasting labs. No history of gestational diabetes  Discussed weight and nutrition. Normal BMI. Lost 10 lbs in past year  GERD: resolved  Prediabete: recheck today

## 2022-02-16 NOTE — PROGRESS NOTES
Patient:   ITZEL TORRE            MRN: [id number]            FIN: [id number]               Age:   39 years     Sex:  Female     :  1980   Associated Diagnoses:   Anxiety; Mild recurrent major depression   Author:   Juancarlos Duenas MD      Visit Information      Date of Service: 2020 11:51 am  Performing Location: [facility]  Encounter#: [id number]      Primary Care Provider (PCP):  Juancarlos Duenas MD    NPI# [id number]      Referring Provider:  Juancarlos Duenas MD, NPI# [id number]   Visit type:  Telephone Encounter.    Source of history:  Patient.    Location of patient:  Home  Call Start Time:   1205  Call End Time:    1212      Chief Complaint   2020 11:52 AM CDT   Chief Complaint     _      History of Present Illness   Today's visit was conducted via telephone due to the COVID-19 pandemic. Patient's consent to telephone visit was obtained and documented.      Reason for visit:    Needs medications refilled. Feeling very good and would like to continue same dose of medications.    Combination of Wellbutrin and effexor has her feeling normal. Anxiety still present but controlled. Has increased Wellbutrin XL to 450mg and tolerating it better (cloudy at that dose in past). Higher dose of effexor blunts her mood.    2012 last alcohol and drugs. Had not been sober since age 16.  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 No narcotics in this clinic.    Past information:  Anxiety is all the time but more noticeable in the evening. She feels irritable. She is anxious about her weight and gets angry mad thoughts about her weight. Her therapist is worried she might relapse with drugs if the anxiety is not controlled. She does not have panic attacks. It is an overwhelming sense of inability to settle down. Does not want to see people because of her weight and knows logically that is not right. Anxiety started when went to  college. Tried paxil, zoloft, cymbalta. Feels blunted when on higher dose of Effexor.     She and  have noticed an improvement in her anxiety. Feels more laid back and gets less done. Feels will always have thoughts about her weight (has thought about it since 6 years old). Imipramine helped her bridge herself until she could work on the mental tools to help with her anxiety. Imipramine has helped all the symptoms she was experiencing in Feb 2014.    Had negative urine testing August and September 2012 (checked 3x) and June 2014      Review of Systems   Gastrointestinal:  No diarrhea.    Integumentary:  No rash.    Neurologic:  No headache.    Wearing compression stockings with varicose veins      Impression and Plan   Diagnosis     Anxiety (GUH92-OL F41.9).     Mild recurrent major depression (NQN57-NW F33.0).       Depression/anxiety: continue Wellbutrin XL 450mg daily and Effexor XR 75mg daily. Continues to be sober.     Have discussed wellbutrin and effexor with pregnancy although no desire to have any more children (has 2 children)  Reports being nervous about handling narcotics and wants to avoid it (none in the Dermatology clinic).         Health Status   Allergies:    Allergic Reactions (Selected)  Severity Not Documented  Penicillin (Rash)   Medications:  (Selected)   Prescriptions  Prescribed  Effexor XR 75 mg oral capsule, extended release: = 1 cap(s) ( 75 mg ), po, daily, # 95 cap(s), 3 Refill(s), Type: Maintenance, Pharmacy: [facility] #[id number], 1 cap(s) Oral daily  buPROPion 150 mg/24 hours (XL) oral tablet, extended release: 3 tab(s) ( 450 mg ), po, q 24 hrs, # 280 tab(s), 3 Refill(s), Type: Maintenance, Pharmacy: [facility] #[id number], 3 tab(s) Oral q 24 hrs   Problem list:    All Problems  Anxiety / SNOMED CT 42116236 / Confirmed  Chemical dependency / SNOMED CT 509674697 / Confirmed  Eating disorder / SNOMED CT 526714255 / Confirmed  Chronic GERD / SNOMED CT 5350675008 /  Confirmed  History of chicken pox / SNOMED CT 103001839 / Confirmed  Mild recurrent major depression / SNOMED CT 423638783 / Confirmed  Seasonal allergies / SNOMED CT 181602906 / Confirmed      Histories   Family History: no bipolar, ADHD, anxiety, suicide; father/mother/brother with depression. Mom addicted oxycontin in past. Brother some addiction  Social History:  (Eduardo) 2009. 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 Children ages 6 and 8 ([birth year] Birthdays)

## 2022-02-16 NOTE — NURSING NOTE
Depression Screening Entered On:  7/30/2019 8:38 AM CDT    Performed On:  7/29/2019 8:36 AM CDT by Latosha Philippe               Depression Screening   Little Interest - Pleasure in Activities :   Not at all   Feeling Down, Depressed, Hopeless :   Not at all   Initial Depression Screen Score :   0    Trouble Falling or Staying Asleep :   Not at all   Feeling Tired or Little Energy :   Several days   Poor Appetite or Overeating :   Several days   Feeling Bad About Yourself :   Not at all   Trouble Concentrating :   Not at all   Moving or Speaking Slowly :   Not at all   Thoughts Better Off Dead or Hurting Self :   Not at all   Detailed Depression Screen Score :   2    Total Depression Screen Score :   2    SY Difficulty with Work, Home, Others :   Not difficult at all   Latosha Philippe - 7/30/2019 8:36 AM CDT

## 2022-02-16 NOTE — NURSING NOTE
Comprehensive Intake Entered On:  6/7/2021 8:11 AM CDT    Performed On:  6/7/2021 8:05 AM CDT by Arti Bernal CMA               Summary   Chief Complaint :   Yearly px   Last Menstrual Period :   5/31/2021 CDT   Menstrual Status :   Menarcheal   Weight Measured :   142.7 lb(Converted to: 142 lb 11 oz, 64.728 kg)    Height Measured :   62 in(Converted to: 5 ft 2 in, 157.48 cm)    Body Mass Index :   26.1 kg/m2 (HI)    Body Surface Area :   1.68 m2   Systolic Blood Pressure :   104 mmHg   Diastolic Blood Pressure :   70 mmHg   Mean Arterial Pressure :   81 mmHg   Peripheral Pulse Rate :   80 bpm   BP Site :   Right arm   Pulse Site :   Radial artery   BP Method :   Manual   HR Method :   Manual   Temperature Tympanic :   97.8 DegF(Converted to: 36.6 DegC)  (LOW)    Arti Bernal CMA - 6/7/2021 8:05 AM CDT   Health Status   Allergies Verified? :   Yes   Medication History Verified? :   Yes   Medical History Verified? :   No   Pre-Visit Planning Status :   Completed   Tobacco Use? :   Never smoker   Arti Bernal CMA - 6/7/2021 8:05 AM CDT   Consents   Consent for Immunization Exchange :   Consent Granted   Consent for Immunizations to Providers :   Consent Granted   Arti Bernal CMA - 6/7/2021 8:05 AM CDT   Meds / Allergies   (As Of: 6/7/2021 8:11:19 AM CDT)   Allergies (Active)   penicillin  Estimated Onset Date:   Unspecified ; Reactions:   rash ; Created By:   Juancarlos Duenas MD; Reaction Status:   Active ; Category:   Drug ; Substance:   penicillin ; Type:   Allergy ; Updated By:   Juancarlos Duenas MD; Reviewed Date:   6/7/2021 8:08 AM CDT        Medication List   (As Of: 6/7/2021 8:11:19 AM CDT)   Prescription/Discharge Order    buPROPion  :   buPROPion ; Status:   Prescribed ; Ordered As Mnemonic:   buPROPion 150 mg/24 hours (XL) oral tablet, extended release ; Simple Display Line:   450 mg, 3 tab(s), po, q 24 hrs, 280 tab(s), 0 Refill(s) ; Ordering Provider:   Juancarlos Duenas MD; Catalog Code:   buPROPion ;  Order Dt/Tm:   5/26/2021 9:22:29 AM CDT          venlafaxine  :   venlafaxine ; Status:   Prescribed ; Ordered As Mnemonic:   Effexor XR 75 mg oral capsule, extended release ; Simple Display Line:   75 mg, 1 cap(s), po, daily, 95 cap(s), 3 Refill(s) ; Ordering Provider:   Juancarlos Duenas MD; Catalog Code:   venlafaxine ; Order Dt/Tm:   5/21/2020 12:11:34 PM CDT            ID Risk Screen   Recent Travel History :   No recent travel   Family Member Travel History :   No recent travel   Other Exposure to Infectious Disease :   Unknown   COVID-19 Testing Status :   No positive COVID-19 test   Arti Bernal CMA - 6/7/2021 8:05 AM CDT   Social History   Social History   (As Of: 6/7/2021 8:11:19 AM CDT)   Alcohol:        Past   (Last Updated: 8/15/2016 10:26:23 AM CDT by Edith Arias RN)          Tobacco:        Never (less than 100 in lifetime)   (Last Updated: 6/7/2021 8:06:10 AM CDT by Arti Bernal CMA)          Electronic Cigarette/Vaping:        Electronic Cigarette Use: Never.   (Last Updated: 6/7/2021 8:06:13 AM CDT by Arti Bernal CMA)          Employment/School:        Employed, Work/School description: RN.   (Last Updated: 5/27/2015 10:18:54 AM CDT by Annie Caldwell)          Home/Environment:        Marital status: .  Spouse/Partner name: Eduardo.   (Last Updated: 9/29/2017 9:48:56 AM CDT by Carolyn Thompson)          Exercise:        Exercise type: Aerobics, Weight lifting.   (Last Updated: 5/27/2015 10:20:40 AM CDT by Annie Caldwell)          Sexual:        Sexually active: Yes.  Sexual orientation: Heterosexual.  Contraceptive Use Details: Birth control pill.   (Last Updated: 8/15/2016 11:16:18 AM CDT by Lynsey Ritchie)

## 2022-02-16 NOTE — NURSING NOTE
Comprehensive Intake Entered On:  5/21/2020 11:58 AM CDT    Performed On:  5/21/2020 11:52 AM CDT by Arti Bernal CMA               Summary   Chief Complaint :   Medication management  verbal consent given for telephone visit   Gabe TOSCANOMeghanArti - 5/21/2020 11:52 AM CDT   Health Status   Allergies Verified? :   Yes   Medication History Verified? :   Yes   Medical History Verified? :   No   Pre-Visit Planning Status :   Not completed   Tobacco Use? :   Never smoker   Gabe TOSCANO Arti - 5/21/2020 11:52 AM CDT   Consents   Consent for Immunization Exchange :   Consent Granted   Consent for Immunizations to Providers :   Consent Granted   Arti Bernal CMA - 5/21/2020 11:52 AM CDT   Meds / Allergies   (As Of: 5/21/2020 11:58:00 AM CDT)   Allergies (Active)   penicillin  Estimated Onset Date:   Unspecified ; Reactions:   rash ; Created By:   Juancarlos Duenas MD; Reaction Status:   Active ; Category:   Drug ; Substance:   penicillin ; Type:   Allergy ; Updated By:   Juancarlos Duenas MD; Reviewed Date:   5/21/2020 11:53 AM CDT        Medication List   (As Of: 5/21/2020 11:58:01 AM CDT)   Prescription/Discharge Order    buPROPion  :   buPROPion ; Status:   Prescribed ; Ordered As Mnemonic:   buPROPion 150 mg/24 hours (XL) oral tablet, extended release ; Simple Display Line:   450 mg, 3 tab(s), po, q 24 hrs, 280 tab(s), 3 Refill(s) ; Ordering Provider:   Juancarlos Duenas MD; Catalog Code:   buPROPion ; Order Dt/Tm:   7/29/2019 2:21:21 PM CDT          venlafaxine  :   venlafaxine ; Status:   Prescribed ; Ordered As Mnemonic:   Effexor XR 75 mg oral capsule, extended release ; Simple Display Line:   75 mg, 1 cap(s), po, daily, 95 cap(s), 3 Refill(s) ; Ordering Provider:   Juancarlos Duenas MD; Catalog Code:   venlafaxine ; Order Dt/Tm:   7/29/2019 2:21:00 PM CDT            ID Risk Screen   Recent Travel History :   No recent travel   Family Member Travel History :   No recent travel   Other Exposure to Infectious Disease :    Unknown   Arti Bernal CMA - 5/21/2020 11:52 AM CDT

## 2022-02-16 NOTE — NURSING NOTE
Comprehensive Intake Entered On:  9/5/2019 4:01 PM CDT    Performed On:  9/5/2019 3:58 PM CDT by Wendy Tamayo CMA               Summary   Chief Complaint :   Concerns with HA, semi-productive cough, nasal drainage/congestion x 5 days. Today chest tightness. No recent travel.   Weight Measured :   140 lb(Converted to: 140 lb 0 oz, 63.50 kg)    Systolic Blood Pressure :   100 mmHg   Diastolic Blood Pressure :   68 mmHg   Mean Arterial Pressure :   79 mmHg   Peripheral Pulse Rate :   96 bpm   BP Site :   Right arm   Pulse Site :   Radial artery   BP Method :   Manual   HR Method :   Electronic   Temperature Tympanic :   98.9 DegF(Converted to: 37.2 DegC)    Oxygen Saturation :   97 %   Wendy Tamayo CMA - 9/5/2019 3:58 PM CDT   Health Status   Allergies Verified? :   Yes   Medication History Verified? :   Yes   Pre-Visit Planning Status :   Not completed   Wendy Tamayo CMA - 9/5/2019 3:58 PM CDT   Meds / Allergies   (As Of: 9/5/2019 4:01:52 PM CDT)   Allergies (Active)   penicillin  Estimated Onset Date:   Unspecified ; Reactions:   rash ; Created By:   Juancarlos Duenas MD; Reaction Status:   Active ; Category:   Drug ; Substance:   penicillin ; Type:   Allergy ; Updated By:   Juancarlos Duenas MD; Reviewed Date:   7/29/2019 2:11 PM CDT        Medication List   (As Of: 9/5/2019 4:01:52 PM CDT)   Prescription/Discharge Order    buPROPion  :   buPROPion ; Status:   Prescribed ; Ordered As Mnemonic:   buPROPion 150 mg/24 hours (XL) oral tablet, extended release ; Simple Display Line:   450 mg, 3 tab(s), po, q 24 hrs, 280 tab(s), 3 Refill(s) ; Ordering Provider:   Juancarlos Duenas MD; Catalog Code:   buPROPion ; Order Dt/Tm:   7/29/2019 2:21:21 PM          venlafaxine  :   venlafaxine ; Status:   Prescribed ; Ordered As Mnemonic:   Effexor XR 75 mg oral capsule, extended release ; Simple Display Line:   75 mg, 1 cap(s), po, daily, 95 cap(s), 3 Refill(s) ; Ordering Provider:   Juancarlos Duenas MD; Catalog Code:    venlafaxine ; Order Dt/Tm:   7/29/2019 2:21:00 PM

## 2022-02-16 NOTE — TELEPHONE ENCOUNTER
---------------------  From: Antonia Gleaosn CMA ([facility] ([id number]))   To: [facility] Message [facility] ([id number] [facility] [place]);     Sent: 6/3/2021 5:52:14 AM CDT  Subject: FW: Medication Management   Due Date/Time: 6/3/2021 6:24:00 PM CDT           ------------------------------------------  From: [facility]  To: Juancarlos Duenas MD  Sent: June 2, 2021 6:24:45 PM CDT  Subject: Medication Management  Due: May 14, 2021 8:13:56 PM CDT     ** On Hold Pending Signature **     Drug: venlafaxine (Effexor XR 75 mg oral capsule, extended release), TAKE ONE CAPSULE BY MOUTH ONCE DAILY  Quantity: 95 unknown unit  Days Supply: 95  Refills: 2  Substitutions Allowed  Notes from Pharmacy:     Dispensed Drug: venlafaxine (venlafaxine 75 mg oral capsule, extended release), TAKE ONE CAPSULE BY MOUTH ONCE DAILY  Quantity: 95 unknown unit  Days Supply: 95  Refills: 3  Substitutions Allowed  Notes from Pharmacy:  ------------------------------------------Tried calling pt to see if she is needing this medication refilled or if she has enough until her apt   on Monday with [facility]. No answer, No VM---------------------  From: Arti Bernal CMA   To: [facility]    Sent: 6/7/2021 7:06:22 AM CDT  Subject: FW: Medication Management     ** Not Approved: Patient needs appointment, Pt has apt today will refill then **  venlafaxine (VENLAFAXINE HCL ER 75MG CP24)  TAKE ONE CAPSULE BY MOUTH ONCE DAILY  Qty:  95 unknown unit        Days Supply:  95        Refills:  3          Substitutions Allowed     Route To Pharmacy - [facility]   Signed by Arti Bernal CMA

## 2022-02-26 ENCOUNTER — HEALTH MAINTENANCE LETTER (OUTPATIENT)
Age: 42
End: 2022-02-26

## 2022-06-06 DIAGNOSIS — F33.0 MILD EPISODE OF RECURRENT MAJOR DEPRESSIVE DISORDER (H): Primary | ICD-10-CM

## 2022-06-09 PROBLEM — Z86.19 HISTORY OF VARICELLA: Status: ACTIVE | Noted: 2022-06-09

## 2022-06-09 PROBLEM — K21.9 GASTROESOPHAGEAL REFLUX DISEASE: Status: ACTIVE | Noted: 2022-06-09

## 2022-06-09 PROBLEM — F41.9 ANXIETY: Status: ACTIVE | Noted: 2022-06-09

## 2022-06-09 PROBLEM — J30.2 SEASONAL ALLERGIC RHINITIS: Status: ACTIVE | Noted: 2022-06-09

## 2022-06-09 PROBLEM — F33.0 MILD EPISODE OF RECURRENT MAJOR DEPRESSIVE DISORDER (H): Status: ACTIVE | Noted: 2022-06-09

## 2022-06-09 PROBLEM — F50.9 EATING DISORDER: Status: ACTIVE | Noted: 2022-06-09

## 2022-06-09 PROBLEM — F19.20 DRUG DEPENDENCE (H): Status: ACTIVE | Noted: 2022-06-09

## 2022-06-09 RX ORDER — VENLAFAXINE HYDROCHLORIDE 75 MG/1
CAPSULE, EXTENDED RELEASE ORAL
Qty: 30 CAPSULE | Refills: 0 | Status: SHIPPED | OUTPATIENT
Start: 2022-06-09

## 2022-06-09 NOTE — TELEPHONE ENCOUNTER
"Routing refill request to provider for review/approval because:  Routing to medical director - Provider no longer here.  Last office visit 6/7/2021  Glucose and Lipids completed 6/7/2021    TC - Please assist patient in scheduling an office visit to establish care with a new provider.      Last Written Prescription Date:  6/7/2021  Last Fill Quantity: 95,  # refills: 3   Last office visit provider:  6/7/2021 Annual Physical     Requested Prescriptions   Pending Prescriptions Disp Refills     venlafaxine (EFFEXOR XR) 75 MG 24 hr capsule [Pharmacy Med Name: VENLAFAXINE ER 75MG CAP] 95 capsule 3     Sig: TAKE ONE CAPSULE BY MOUTH EVERY DAY       Serotonin-Norepinephrine Reuptake Inhibitors  Failed - 6/6/2022  9:57 AM        Failed - Recent (12 mo) or future (30 days) visit within the authorizing provider's specialty     Patient has had an office visit with the authorizing provider or a provider within the authorizing providers department within the previous 12 mos or has a future within next 30 days. See \"Patient Info\" tab in inbasket, or \"Choose Columns\" in Meds & Orders section of the refill encounter.              Failed - Medication is active on med list        Failed - Normal serum creatinine on file in past 12 months     No lab results found.    Ok to refill medication if creatinine is low          Passed - Blood pressure under 140/90 in past 12 months     BP Readings from Last 3 Encounters:   06/07/21 104/70   09/05/19 100/68   07/29/19 112/70                 Passed - Patient is age 18 or older        Passed - No active pregnancy on record        Passed - No positive pregnancy test in past 12 months             Daniel Mayberry RN 06/09/22 12:52 PM  "

## 2022-10-29 ENCOUNTER — HEALTH MAINTENANCE LETTER (OUTPATIENT)
Age: 42
End: 2022-10-29

## 2023-09-03 ENCOUNTER — HEALTH MAINTENANCE LETTER (OUTPATIENT)
Age: 43
End: 2023-09-03

## 2024-03-31 ENCOUNTER — HEALTH MAINTENANCE LETTER (OUTPATIENT)
Age: 44
End: 2024-03-31